# Patient Record
Sex: MALE | Race: WHITE | NOT HISPANIC OR LATINO | Employment: FULL TIME | URBAN - METROPOLITAN AREA
[De-identification: names, ages, dates, MRNs, and addresses within clinical notes are randomized per-mention and may not be internally consistent; named-entity substitution may affect disease eponyms.]

---

## 2018-05-29 ENCOUNTER — HOSPITAL ENCOUNTER (EMERGENCY)
Facility: HOSPITAL | Age: 28
Discharge: HOME | End: 2018-05-29
Payer: COMMERCIAL

## 2018-05-29 VITALS
BODY MASS INDEX: 25.56 KG/M2 | RESPIRATION RATE: 19 BRPM | HEIGHT: 71 IN | WEIGHT: 182.56 LBS | OXYGEN SATURATION: 98 % | TEMPERATURE: 97 F | SYSTOLIC BLOOD PRESSURE: 168 MMHG | HEART RATE: 102 BPM | DIASTOLIC BLOOD PRESSURE: 88 MMHG
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
## 2018-05-29 PROCEDURE — 99283 EMERGENCY DEPT VISIT LOW MDM: CPT

## 2018-05-29 RX ORDER — DOXYCYCLINE 100 MG/1
100 CAPSULE ORAL 2 TIMES DAILY
Qty: 20 CAPSULE | Refills: 0 | Status: SHIPPED | OUTPATIENT
Start: 2018-05-29 | End: 2018-06-08

## 2018-05-30 NOTE — ED PROVIDER NOTES
SCRIBE #1 NOTE: I, Payton Oden, am scribing for, and in the presence of, Sofia Spear PA-C. I have scribed the entire note.      History      Chief Complaint   Patient presents with    Tick Removal     reports tick bite to right foot last monday (found it there after camping all weekend) states a red circular spot popped up on opposite foot tonight.        Review of patient's allergies indicates:  No Known Allergies     HPI   HPI    5/29/2018, 10:13 PM   History obtained from the patient      History of Present Illness: Mauri Boston is a 27 y.o. male patient who presents to the Emergency Department for suspected tick bite that he noticed tonight. Symptoms are constant and moderate in severity. Pt reports he did not see the tick bite after weekend of camping in [place] but reports having similar sxs 9 days ago on the R foot after having a tick bite and removed a tick. No mitigating or exacerbating factors reported. No associated sxs at this time. Patient denies any CP, SOB, fever, chills, n/v/d, dizziness, numbness/weakness, and all other sxs at this time. No prior Tx. No further complaints or concerns at this time.         Arrival mode: Personal vehicle      PCP: Brock Talbot MD       Past Medical History:  Past medical history reviewed not relevant      Past Surgical History:  Past surgical history reviewed not relevant      Family History:  Family history reviewed not relevant      Social History:  Social History    Social History Main Topics    Social History Main Topics    Smoking status: Unknown if ever smoked    Smokeless tobacco: Unknown if ever used    Alcohol Use: Unknown drinking history    Drug Use: Unknown if ever used    Sexual Activity: Unknown       ROS   Review of Systems   Constitutional: Negative for chills and fever.   HENT: Negative for sore throat.    Respiratory: Negative for shortness of breath.    Cardiovascular: Negative for chest pain.   Gastrointestinal: Negative  "for abdominal pain, diarrhea, nausea and vomiting.   Genitourinary: Negative for dysuria.   Musculoskeletal: Negative for back pain.   Skin: Negative for rash.        (+) Tick bite to L foot   Neurological: Negative for dizziness, weakness, numbness and headaches.   Hematological: Does not bruise/bleed easily.   All other systems reviewed and are negative.    Physical Exam      Initial Vitals [05/29/18 2152]   BP Pulse Resp Temp SpO2   (!) 168/88 102 19 97.4 °F (36.3 °C) 98 %      MAP       114.67          Physical Exam  Nursing Notes and Vital Signs Reviewed.  Constitutional: Patient is in no acute distress. Well-developed and well-nourished.  Head: Atraumatic. Normocephalic.  Eyes: PERRL. EOM intact. Conjunctivae are not pale. No scleral icterus.  ENT: Mucous membranes are moist. Oropharynx is clear and symmetric.    Neck: Supple. Full ROM. No lymphadenopathy.  Cardiovascular: Regular rate. Regular rhythm. No murmurs, rubs, or gallops. Distal pulses are 2+ and symmetric.  Pulmonary/Chest: No respiratory distress. Clear to auscultation bilaterally. No wheezing or rales.  Abdominal: Soft and non-distended.  There is no tenderness.  No rebound, guarding, or rigidity. Good bowel sounds.  Genitourinary: No CVA tenderness  Musculoskeletal: Moves all extremities. No obvious deformities. No edema. No calf tenderness.  Skin: Warm and dry. Dorsum of L foot erythematous circular pattern with central clearing. See image.             R plantar surface of 4th toe has very mild erythema.   Neurological:  Alert, awake, and appropriate.  Normal speech.  No acute focal neurological deficits are appreciated.  Psychiatric: Normal affect. Good eye contact. Appropriate in content.    ED Course    Procedures  ED Vital Signs:  Vitals:    05/29/18 2152   BP: (!) 168/88   Pulse: 102   Resp: 19   Temp: 97.4 °F (36.3 °C)   TempSrc: Oral   SpO2: 98%   Weight: 82.8 kg (182 lb 8.7 oz)   Height: 5' 11" (1.803 m)                The Emergency " Provider reviewed the vital signs and test results, which are outlined above.    ED Discussion     10:34 PM: Will treat pt for STARI as rates are high in [place]. Near zero annual rate of lyme disease in [place]. However, pt has pcp to f/u with who can extend doxy treatment to cover for Lyme as well if pcp wishes.  Discussed with pt all pertinent ED information and results. Discussed pt dx and plan of tx. Gave pt all f/u and return to the ED instructions. All questions and concerns were addressed at this time. Pt expresses understanding of information and instructions, and is comfortable with plan to discharge. Pt is stable for discharge.    I discussed wound care precautions with patient and/or family/caretaker; specifically that all wounds have risk of infection despite efforts to cleanse and debride the wound; and there is a risk of an occult foreign body (and thus increased risk of infection) despite a negative examination.  I discussed with patient need to return for any signs of infection, specifically redness, increased pain, fever, drainage of pus, or any concern, immediately.      ED Medication(s):  Medications - No data to display    Discharge Medication List as of 5/29/2018 10:41 PM      START taking these medications    Details   doxycycline (VIBRAMYCIN) 100 MG Cap Take 1 capsule (100 mg total) by mouth 2 (two) times daily., Starting Tue 5/29/2018, Until Fri 6/8/2018, Print             Follow-up Information     Brock Talbot MD In 3 days.    Specialty:  Pediatrics  Contact information:  [id number] [facility] [id number] [phone]                     Medical Decision Making              Scribe Attestation:   Scribe #1: I performed the above scribed service and the documentation accurately describes the services I performed. I attest to the accuracy of the note.    Attending:   Physician Attestation Statement for Scribe #1: I, Sofia Spear PA-C,  personally performed the services described in this documentation, as scribed by Payton Oden, in my presence, and it is both accurate and complete.          Clinical Impression       ICD-10-CM ICD-9-CM   1. Tick bite, initial encounter W57.XXXA 919.4     E906.4       Disposition:   Disposition: Discharged  Condition: Stable         Sofia Spear PA-C  05/29/18 [id number]